# Patient Record
(demographics unavailable — no encounter records)

---

## 2025-05-27 NOTE — PROCEDURE
[FreeTextEntry1] : cosmetic [FreeTextEntry2] : Restylane Lyft to nose and chin [FreeTextEntry3] : lidocaine cream [FreeTextEntry6] : Under aseptic technique, Restylane Lyft injection was performed: nasal dorsum:0.3ml chin: 0.4ml  for a total of 0.7ml  Patient tolerated procedure well.  LOT # [id number] Expiry date: 2027-02-28

## 2025-05-27 NOTE — REASON FOR VISIT
[Procedure: _________] : a [unfilled] procedure visit [FreeTextEntry1] : hyaluronic acid injection to the nose and chin (SELF-PAY)